# Patient Record
Sex: MALE | Race: WHITE | NOT HISPANIC OR LATINO | Employment: OTHER | URBAN - METROPOLITAN AREA
[De-identification: names, ages, dates, MRNs, and addresses within clinical notes are randomized per-mention and may not be internally consistent; named-entity substitution may affect disease eponyms.]

---

## 2017-08-01 ENCOUNTER — IMPORTED ENCOUNTER (OUTPATIENT)
Dept: URBAN - METROPOLITAN AREA CLINIC 43 | Facility: CLINIC | Age: 82
End: 2017-08-01

## 2017-08-01 PROBLEM — H35.363: Noted: 2017-08-01

## 2017-08-01 PROBLEM — H26.493: Noted: 2017-08-01

## 2017-09-06 ENCOUNTER — IMPORTED ENCOUNTER (OUTPATIENT)
Dept: URBAN - METROPOLITAN AREA CLINIC 43 | Facility: CLINIC | Age: 82
End: 2017-09-06

## 2017-09-06 PROBLEM — H26.493: Noted: 2017-09-06

## 2017-09-06 PROBLEM — INACTIVE: Noted: 2017-09-06

## 2020-04-19 ASSESSMENT — VISUAL ACUITY
OD_SC: J5
OS_SC: 20/25
OS_SC: 20/25
OD_OTHER: <20/400.
OS_SC: J1-
OS_SC: J2+
OD_SC: J4
OD_SC: 20/40
OD_SC: 20/40-1
OS_OTHER: <20/400.

## 2020-04-19 ASSESSMENT — KERATOMETRY
OS_AXISANGLE2_DEGREES: 180
OD_AXISANGLE_DEGREES: 80
OS_AXISANGLE_DEGREES: 90
OD_K1POWER_DIOPTERS: 43.5
OS_K1POWER_DIOPTERS: 43.5
OD_AXISANGLE2_DEGREES: 170
OD_K2POWER_DIOPTERS: 43.25
OS_K2POWER_DIOPTERS: 43

## 2020-04-19 ASSESSMENT — TONOMETRY
OD_IOP_MMHG: 15.0
OS_IOP_MMHG: 15.0
OD_IOP_MMHG: 13.0
OS_IOP_MMHG: 13.0

## 2021-03-31 NOTE — PATIENT DISCUSSION
CATARACTS, OU: VISUALLY SIGNIFICANT. OPTION OF SURGERY VERSUS FOLLOWING VERSUS UPDATING GLASSES DISCUSSED. RBA'S DISCUSSED, PATIENT UNDERSTANDS AND DESIRES SURGERY TO INCREASE VISION FOR DRIVING AND GLARE FROM LIGHTS.   SCHEDULE CATARACT SURGERY/PRE-OP OU

## 2021-04-19 NOTE — PATIENT DISCUSSION
CATARACT OD: RBA'S DISCUSSED, PATIENT UNDERSTANDS AND DESIRES TO PROCEED WITH SURGERY. CONSENT READ AND SIGNED. PATIENT DESIRES TORIC SET FOR DISTANCE VISION.

## 2021-04-19 NOTE — PATIENT DISCUSSION
New Prescription: prednisoln vs-drpqbaza-ycxzfrz (prednisoln gx-qbbmldwi-erdlqty): drops: 1-0.5-0.075% 1 drop four times a day into affected eye 04-

## 2021-05-05 NOTE — PATIENT DISCUSSION
Continue: prednisoln ec-rmcyknsx-sqcrzsk (prednisoln xo-thrbcdhb-qzmngkv): drops: 1-0.5-0.075% 1 drop four times a day into affected eye 04-

## 2021-05-05 NOTE — PATIENT DISCUSSION
S/P PCIOL OD: INTRAOCULAR PRESSURE IS ELEVATED. INSTILL ONE DROP EACH OF COMBIGAN AND TRAVATAN IN POSTOPERATIVE EYE. PRESSURE DOWN TO 21. RETURN FOR FOLLOW-UP IN 2 WEEKS.

## 2021-05-17 NOTE — PATIENT DISCUSSION
Continue: prednisoln uv-pyxkppyo-nurrblz (prednisoln eq-lumywzpc-jpwainy): drops: 1-0.5-0.075% 1 drop four times a day into affected eye 04-

## 2021-05-17 NOTE — PATIENT DISCUSSION
CATARACT SX OS: RBA'S DISCUSSED, PATIENT UNDERSTANDS AND DESIRES TO PROCEED WITH SURGERY. CONSENT READ AND SIGNED.   PATIENT DESIRES STANDARD SET FOR DISTANCE WITH LRI

## 2021-05-17 NOTE — PATIENT DISCUSSION
Pre-Op 2nd Eye Counseling: The patient has noticed an improvement in their visual symptoms in the operative eye. The patient complains of decreased vision in the fellow eye when driving at night. It was explained to the patient that the decision to proceed with cataract surgery in the fellow eye is entirely a separate decision from the surgical eye. All of the same risks, benefits and alternatives ere reviewed with the patient again. The patient does feel the vision in the non-operative eye is limiting their daily activities and elects to proceed with cataract surgery in the Left eye. Schedule cataract surgery/ pre op OS.

## 2021-09-15 ENCOUNTER — NEW PATIENT COMPREHENSIVE (OUTPATIENT)
Dept: URBAN - METROPOLITAN AREA CLINIC 32 | Facility: CLINIC | Age: 86
End: 2021-09-15

## 2021-09-15 DIAGNOSIS — H35.3131: ICD-10-CM

## 2021-09-15 PROCEDURE — 92004 COMPRE OPH EXAM NEW PT 1/>: CPT

## 2021-09-15 PROCEDURE — 92134 CPTRZ OPH DX IMG PST SGM RTA: CPT

## 2021-09-15 ASSESSMENT — VISUAL ACUITY
OS_SC: J1+1
OS_SC: 20/25-1
OD_SC: J1
OD_SC: 20/40-1

## 2021-09-15 ASSESSMENT — TONOMETRY
OS_IOP_MMHG: 13
OD_IOP_MMHG: 13

## 2022-09-21 ENCOUNTER — COMPREHENSIVE EXAM (OUTPATIENT)
Dept: URBAN - METROPOLITAN AREA CLINIC 32 | Facility: CLINIC | Age: 87
End: 2022-09-21

## 2022-09-21 DIAGNOSIS — H40.013: ICD-10-CM

## 2022-09-21 DIAGNOSIS — H04.123: ICD-10-CM

## 2022-09-21 DIAGNOSIS — H35.3131: ICD-10-CM

## 2022-09-21 PROCEDURE — 92134 CPTRZ OPH DX IMG PST SGM RTA: CPT

## 2022-09-21 PROCEDURE — 92014 COMPRE OPH EXAM EST PT 1/>: CPT

## 2022-09-21 ASSESSMENT — VISUAL ACUITY
OD_SC: 20/40-2
OD_SC: J1
OS_SC: 20/25
OS_SC: J2

## 2022-09-21 ASSESSMENT — KERATOMETRY
OD_K2POWER_DIOPTERS: 43.25
OS_AXISANGLE_DEGREES: 160
OD_K1POWER_DIOPTERS: 43.50
OD_AXISANGLE_DEGREES: 140
OS_K1POWER_DIOPTERS: 43.50
OS_AXISANGLE2_DEGREES: 70
OD_AXISANGLE2_DEGREES: 50
OS_K2POWER_DIOPTERS: 43.25

## 2022-09-21 ASSESSMENT — TONOMETRY
OS_IOP_MMHG: 17
OD_IOP_MMHG: 16

## 2023-09-26 ENCOUNTER — COMPREHENSIVE EXAM (OUTPATIENT)
Dept: URBAN - METROPOLITAN AREA CLINIC 32 | Facility: CLINIC | Age: 88
End: 2023-09-26

## 2023-09-26 DIAGNOSIS — H40.013: ICD-10-CM

## 2023-09-26 DIAGNOSIS — H35.3131: ICD-10-CM

## 2023-09-26 DIAGNOSIS — H16.223: ICD-10-CM

## 2023-09-26 PROCEDURE — 99214 OFFICE O/P EST MOD 30 MIN: CPT

## 2023-09-26 PROCEDURE — 92133 CPTRZD OPH DX IMG PST SGM ON: CPT

## 2023-09-26 ASSESSMENT — VISUAL ACUITY
OS_SC: 20/40+1
OD_SC: 20/40-2
OD_SC: J1+
OS_SC: J1

## 2023-09-26 ASSESSMENT — KERATOMETRY
OS_K1POWER_DIOPTERS: 43.25
OS_K2POWER_DIOPTERS: 42.75
OD_AXISANGLE_DEGREES: 164
OS_AXISANGLE2_DEGREES: 81
OS_AXISANGLE_DEGREES: 171
OD_K1POWER_DIOPTERS: 43.75
OD_K2POWER_DIOPTERS: 43.00
OD_AXISANGLE2_DEGREES: 74

## 2023-09-26 ASSESSMENT — TONOMETRY
OS_IOP_MMHG: 18
OD_IOP_MMHG: 14